# Patient Record
Sex: FEMALE | NOT HISPANIC OR LATINO | URBAN - METROPOLITAN AREA
[De-identification: names, ages, dates, MRNs, and addresses within clinical notes are randomized per-mention and may not be internally consistent; named-entity substitution may affect disease eponyms.]

---

## 2017-05-25 PROCEDURE — 81001 URINALYSIS AUTO W/SCOPE: CPT | Performed by: FAMILY MEDICINE

## 2017-11-20 PROCEDURE — 81001 URINALYSIS AUTO W/SCOPE: CPT | Performed by: FAMILY MEDICINE

## 2019-09-23 PROCEDURE — 87086 URINE CULTURE/COLONY COUNT: CPT | Performed by: PHYSICIAN ASSISTANT

## 2019-09-23 PROCEDURE — 81015 MICROSCOPIC EXAM OF URINE: CPT | Performed by: PHYSICIAN ASSISTANT

## 2020-08-25 PROBLEM — Z85.828 HISTORY OF BASAL CELL CARCINOMA (BCC) OF SKIN: Status: ACTIVE | Noted: 2020-08-25

## 2021-02-25 PROBLEM — L89.810: Status: RESOLVED | Noted: 2021-02-25 | Resolved: 2021-02-25

## 2021-02-25 PROBLEM — L89.810: Status: ACTIVE | Noted: 2021-02-25

## 2021-08-31 PROBLEM — N81.10 BLADDER PROLAPSE, FEMALE, ACQUIRED: Status: ACTIVE | Noted: 2021-08-31

## 2021-11-29 ENCOUNTER — EXTERNAL RECORD (OUTPATIENT)
Dept: OTHER | Age: 81
End: 2021-11-29

## 2021-11-29 LAB
CHOLESTEROL HDL RATIO: 2.5
CHOLESTEROL: 172 MG/DL
HDL CHOLESTEROL: 68 MG/DL
LDL CHOLESTEROL DIRECT: 93 MG/DL (ref 0–129)
NON HDL CHOLESTEROL: 104 MG/DL
TRIGLYCERIDES: 54 MG/DL
VLDL CHOLESTEROL: 11 MG/DL (ref 5–30)

## 2021-11-29 PROCEDURE — 99223 1ST HOSP IP/OBS HIGH 75: CPT | Performed by: STUDENT IN AN ORGANIZED HEALTH CARE EDUCATION/TRAINING PROGRAM

## 2021-11-30 ENCOUNTER — EXTERNAL RECORD (OUTPATIENT)
Dept: GASTROENTEROLOGY | Age: 81
End: 2021-11-30

## 2021-11-30 LAB
*MEAN CORPUSCULAR HGB CONC: 32.5 G/DL (ref 32.5–35.8)
A/G RATIO: 1.38 (ref 1.1–2.4)
ALANINE AMINOTRANSFE: 8 U/L (ref 7–52)
ALBUMIN, SERUM (ALB): 3.3 G/DL (ref 3.5–5.7)
ALKALINE PHOSPHATASE (ALK): 69 U/L (ref 34–104)
ANION GAP: 7 MEQ/L (ref 6.2–14.7)
ASPARTATE AMINOTRANS: 12 U/L (ref 13–39)
BASOPHIL AUTOMATED: 0.1 %
BASOPHILS: 0 (ref 0–0.14)
BILIRUBIN, TOTAL: 0.6 MG/DL (ref 0.2–0.8)
BLOOD UREA NITROGEN (BUN): 18 MG/DL (ref 7–25)
BUN/CREATININE RATIO: 36 (ref 7.4–23)
CALCIUM, SERUM: 8.8 MG/DL (ref 8.6–10.3)
CARBON DIOXIDE: 24 MEQ/L (ref 21–31)
CHLORIDE, SERUM: 111 MMOL/L (ref 98–107)
CREATININE: 0.5 MG/DL (ref 0.6–1.2)
EOSINOPHIL AUTOMATED: 0 %
EOSINOPHILS: 0 (ref 0–0.6)
EST GLOMERULAR FILTRATION RATE: > 60 ML/MIN
GLUCOSE: 131 MG/DL (ref 70–99)
HEMATOCRIT: 42.3 % (ref 34.7–45.1)
HEMOGLOBIN: 13.7 GM/DL (ref 12–15.3)
K (POTASSIUM, SERUM): 3.8 MMOL/L (ref 3.5–5.2)
LYMPHOCYTE AUTOMATED: 7.2 %
LYMPHOCYTES: 1 (ref 0.78–3.73)
MEAN CORPUSCULAR HGB: 28.3 PG (ref 26–34)
MEAN CORPUSCULAR VOL: 87.3 FL (ref 80–100)
MEAN PLATELET VOLUME: 7.6 FL (ref 6.8–10.2)
MONOCYTE AUTOMATED: 7 %
MONOCYTES: 1 (ref 0.17–1)
NA (SODIUM, SERUM): 142 MMOL/L (ref 133–144)
NEUTROPHIL ABSOLUTE: 11.8 (ref 1.91–7.6)
NEUTROPHIL AUTOMATED: 85.7 %
PLATELET COUNT: 186 K/MM3 (ref 150–450)
PROTEIN TOTAL: 5.7 G/DL (ref 6.4–8.9)
RED BLOOD CELL COUNT: 4.84 M/MM3 (ref 3.63–5.04)
RED CELL DISTRIBUTIO: 14.3 % (ref 11.9–15.9)
WHITE BLOOD CELL COU: 13.7 K/MM3 (ref 4–11)

## 2021-11-30 PROCEDURE — 99223 1ST HOSP IP/OBS HIGH 75: CPT | Performed by: INTERNAL MEDICINE

## 2021-11-30 PROCEDURE — 99233 SBSQ HOSP IP/OBS HIGH 50: CPT | Performed by: STUDENT IN AN ORGANIZED HEALTH CARE EDUCATION/TRAINING PROGRAM

## 2021-12-01 LAB
*MEAN CORPUSCULAR HGB CONC: 33.7 G/DL (ref 32.5–35.8)
ANION GAP: 7 MEQ/L (ref 6.2–14.7)
BLOOD UREA NITROGEN (BUN): 13 MG/DL (ref 7–25)
BUN/CREATININE RATIO: 28.9 (ref 7.4–23)
CALCIUM, SERUM: 8.7 MG/DL (ref 8.6–10.3)
CARBON DIOXIDE: 24 MEQ/L (ref 21–31)
CHLORIDE, SERUM: 110 MMOL/L (ref 98–107)
CREATININE: 0.45 MG/DL (ref 0.6–1.2)
EST GLOMERULAR FILTRATION RATE: > 60 ML/MIN
GLUCOSE: 92 MG/DL (ref 70–99)
HEMATOCRIT: 36.7 % (ref 34.7–45.1)
HEMOGLOBIN: 12.4 GM/DL (ref 12–15.3)
K (POTASSIUM, SERUM): 3.2 MMOL/L (ref 3.5–5.2)
MEAN CORPUSCULAR HGB: 28.9 PG (ref 26–34)
MEAN CORPUSCULAR VOL: 85.7 FL (ref 80–100)
MEAN PLATELET VOLUME: 7.9 FL (ref 6.8–10.2)
MG (MAGNESIUM, SERUM: 1.8 MG/DL (ref 1.6–2.6)
NA (SODIUM, SERUM): 141 MMOL/L (ref 133–144)
PLATELET COUNT: 147 K/MM3 (ref 150–450)
RED BLOOD CELL COUNT: 4.28 M/MM3 (ref 3.63–5.04)
RED CELL DISTRIBUTIO: 14.2 % (ref 11.9–15.9)
WHITE BLOOD CELL COU: 9.9 K/MM3 (ref 4–11)

## 2021-12-01 PROCEDURE — 99233 SBSQ HOSP IP/OBS HIGH 50: CPT | Performed by: STUDENT IN AN ORGANIZED HEALTH CARE EDUCATION/TRAINING PROGRAM

## 2021-12-01 PROCEDURE — 99233 SBSQ HOSP IP/OBS HIGH 50: CPT | Performed by: INTERNAL MEDICINE

## 2021-12-02 LAB
A/G RATIO: 1.17 (ref 1.1–2.4)
ALANINE AMINOTRANSFE: 7 U/L (ref 7–52)
ALBUMIN, SERUM (ALB): 2.8 G/DL (ref 3.5–5.7)
ALKALINE PHOSPHATASE (ALK): 63 U/L (ref 34–104)
ANION GAP: 9 MEQ/L (ref 6.2–14.7)
ASPARTATE AMINOTRANS: 13 U/L (ref 13–39)
BILIRUBIN, TOTAL: 0.7 MG/DL (ref 0.2–0.8)
BLOOD UREA NITROGEN (BUN): 13 MG/DL (ref 7–25)
BUN/CREATININE RATIO: 30.2 (ref 7.4–23)
CALCIUM, SERUM: 8.3 MG/DL (ref 8.6–10.3)
CARBON DIOXIDE: 22 MEQ/L (ref 21–31)
CHLORIDE, SERUM: 110 MMOL/L (ref 98–107)
CREATININE: 0.43 MG/DL (ref 0.6–1.2)
EST GLOMERULAR FILTRATION RATE: > 60 ML/MIN
GLUCOSE: 74 MG/DL (ref 70–99)
K (POTASSIUM, SERUM): 3.4 MMOL/L (ref 3.5–5.2)
NA (SODIUM, SERUM): 141 MMOL/L (ref 133–144)
PROTEIN TOTAL: 5.2 G/DL (ref 6.4–8.9)

## 2021-12-02 PROCEDURE — 99232 SBSQ HOSP IP/OBS MODERATE 35: CPT | Performed by: STUDENT IN AN ORGANIZED HEALTH CARE EDUCATION/TRAINING PROGRAM

## 2021-12-03 PROCEDURE — 99239 HOSP IP/OBS DSCHRG MGMT >30: CPT | Performed by: STUDENT IN AN ORGANIZED HEALTH CARE EDUCATION/TRAINING PROGRAM

## 2021-12-06 ENCOUNTER — TELEPHONE (OUTPATIENT)
Dept: GASTROENTEROLOGY | Age: 81
End: 2021-12-06

## 2023-06-05 ENCOUNTER — ANESTHESIA EVENT (OUTPATIENT)
Dept: SURGERY | Facility: HOSPITAL | Age: 83
End: 2023-06-05
Payer: MEDICARE

## 2023-06-05 ENCOUNTER — HOSPITAL ENCOUNTER (OUTPATIENT)
Facility: HOSPITAL | Age: 83
Setting detail: HOSPITAL OUTPATIENT SURGERY
Discharge: HOME | End: 2023-06-05
Attending: STUDENT IN AN ORGANIZED HEALTH CARE EDUCATION/TRAINING PROGRAM | Admitting: STUDENT IN AN ORGANIZED HEALTH CARE EDUCATION/TRAINING PROGRAM
Payer: MEDICARE

## 2023-06-05 ENCOUNTER — ANESTHESIA (OUTPATIENT)
Dept: SURGERY | Facility: HOSPITAL | Age: 83
End: 2023-06-05
Payer: MEDICARE

## 2023-06-05 VITALS
HEIGHT: 66.5 IN | RESPIRATION RATE: 16 BRPM | WEIGHT: 150 LBS | DIASTOLIC BLOOD PRESSURE: 83 MMHG | OXYGEN SATURATION: 96 % | BODY MASS INDEX: 23.82 KG/M2 | SYSTOLIC BLOOD PRESSURE: 145 MMHG | TEMPERATURE: 99 F | HEART RATE: 76 BPM

## 2023-06-05 PROCEDURE — 0USG0ZZ REPOSITION VAGINA, OPEN APPROACH: ICD-10-PCS | Performed by: STUDENT IN AN ORGANIZED HEALTH CARE EDUCATION/TRAINING PROGRAM

## 2023-06-05 PROCEDURE — 0JQC0ZZ REPAIR PELVIC REGION SUBCUTANEOUS TISSUE AND FASCIA, OPEN APPROACH: ICD-10-PCS | Performed by: STUDENT IN AN ORGANIZED HEALTH CARE EDUCATION/TRAINING PROGRAM

## 2023-06-05 PROCEDURE — 0ULG7ZZ OCCLUSION OF VAGINA, VIA NATURAL OR ARTIFICIAL OPENING: ICD-10-PCS | Performed by: STUDENT IN AN ORGANIZED HEALTH CARE EDUCATION/TRAINING PROGRAM

## 2023-06-05 RX ORDER — HYDROMORPHONE HYDROCHLORIDE 1 MG/ML
0.2 INJECTION, SOLUTION INTRAMUSCULAR; INTRAVENOUS; SUBCUTANEOUS EVERY 5 MIN PRN
Status: DISCONTINUED | OUTPATIENT
Start: 2023-06-05 | End: 2023-06-05

## 2023-06-05 RX ORDER — SODIUM CHLORIDE, SODIUM LACTATE, POTASSIUM CHLORIDE, CALCIUM CHLORIDE 600; 310; 30; 20 MG/100ML; MG/100ML; MG/100ML; MG/100ML
INJECTION, SOLUTION INTRAVENOUS CONTINUOUS
Status: DISCONTINUED | OUTPATIENT
Start: 2023-06-05 | End: 2023-06-05

## 2023-06-05 RX ORDER — HEPARIN SODIUM 5000 [USP'U]/ML
5000 INJECTION, SOLUTION INTRAVENOUS; SUBCUTANEOUS ONCE
Status: COMPLETED | OUTPATIENT
Start: 2023-06-05 | End: 2023-06-05

## 2023-06-05 RX ORDER — CEFAZOLIN SODIUM/WATER 2 G/20 ML
2 SYRINGE (ML) INTRAVENOUS ONCE
Status: COMPLETED | OUTPATIENT
Start: 2023-06-05 | End: 2023-06-05

## 2023-06-05 RX ORDER — POLYETHYLENE GLYCOL 3350 17 G/17G
17 POWDER, FOR SOLUTION ORAL DAILY PRN
Qty: 72 EACH | Refills: 0 | Status: SHIPPED | OUTPATIENT
Start: 2023-06-05 | End: 2023-07-05

## 2023-06-05 RX ORDER — METOCLOPRAMIDE HYDROCHLORIDE 5 MG/ML
10 INJECTION INTRAMUSCULAR; INTRAVENOUS EVERY 8 HOURS PRN
Status: DISCONTINUED | OUTPATIENT
Start: 2023-06-05 | End: 2023-06-05

## 2023-06-05 RX ORDER — EPHEDRINE SULFATE 50 MG/ML
INJECTION INTRAVENOUS AS NEEDED
Status: DISCONTINUED | OUTPATIENT
Start: 2023-06-05 | End: 2023-06-05 | Stop reason: SURG

## 2023-06-05 RX ORDER — KETOROLAC TROMETHAMINE 10 MG/1
10 TABLET, FILM COATED ORAL EVERY 6 HOURS PRN
Qty: 20 TABLET | Refills: 0 | Status: SHIPPED | OUTPATIENT
Start: 2023-06-05 | End: 2023-06-10

## 2023-06-05 RX ORDER — DEXAMETHASONE SODIUM PHOSPHATE 4 MG/ML
VIAL (ML) INJECTION AS NEEDED
Status: DISCONTINUED | OUTPATIENT
Start: 2023-06-05 | End: 2023-06-05 | Stop reason: SURG

## 2023-06-05 RX ORDER — HYDROMORPHONE HYDROCHLORIDE 1 MG/ML
0.4 INJECTION, SOLUTION INTRAMUSCULAR; INTRAVENOUS; SUBCUTANEOUS EVERY 5 MIN PRN
Status: DISCONTINUED | OUTPATIENT
Start: 2023-06-05 | End: 2023-06-05

## 2023-06-05 RX ORDER — LIDOCAINE HYDROCHLORIDE 10 MG/ML
INJECTION, SOLUTION EPIDURAL; INFILTRATION; INTRACAUDAL; PERINEURAL AS NEEDED
Status: DISCONTINUED | OUTPATIENT
Start: 2023-06-05 | End: 2023-06-05 | Stop reason: SURG

## 2023-06-05 RX ORDER — ONDANSETRON 2 MG/ML
4 INJECTION INTRAMUSCULAR; INTRAVENOUS EVERY 6 HOURS PRN
Status: DISCONTINUED | OUTPATIENT
Start: 2023-06-05 | End: 2023-06-05

## 2023-06-05 RX ORDER — ACETAMINOPHEN 500 MG
1000 TABLET ORAL ONCE
Status: COMPLETED | OUTPATIENT
Start: 2023-06-05 | End: 2023-06-05

## 2023-06-05 RX ORDER — ONDANSETRON 2 MG/ML
INJECTION INTRAMUSCULAR; INTRAVENOUS AS NEEDED
Status: DISCONTINUED | OUTPATIENT
Start: 2023-06-05 | End: 2023-06-05 | Stop reason: SURG

## 2023-06-05 RX ORDER — PHENAZOPYRIDINE HYDROCHLORIDE 100 MG/1
100 TABLET, FILM COATED ORAL ONCE
Status: COMPLETED | OUTPATIENT
Start: 2023-06-05 | End: 2023-06-05

## 2023-06-05 RX ORDER — NALOXONE HYDROCHLORIDE 0.4 MG/ML
80 INJECTION, SOLUTION INTRAMUSCULAR; INTRAVENOUS; SUBCUTANEOUS AS NEEDED
Status: DISCONTINUED | OUTPATIENT
Start: 2023-06-05 | End: 2023-06-05

## 2023-06-05 RX ORDER — HYDROMORPHONE HYDROCHLORIDE 1 MG/ML
0.6 INJECTION, SOLUTION INTRAMUSCULAR; INTRAVENOUS; SUBCUTANEOUS EVERY 5 MIN PRN
Status: DISCONTINUED | OUTPATIENT
Start: 2023-06-05 | End: 2023-06-05

## 2023-06-05 RX ORDER — MORPHINE SULFATE 4 MG/ML
2 INJECTION, SOLUTION INTRAMUSCULAR; INTRAVENOUS EVERY 10 MIN PRN
Status: DISCONTINUED | OUTPATIENT
Start: 2023-06-05 | End: 2023-06-05

## 2023-06-05 RX ORDER — KETOROLAC TROMETHAMINE 15 MG/ML
15 INJECTION, SOLUTION INTRAMUSCULAR; INTRAVENOUS ONCE
Status: COMPLETED | OUTPATIENT
Start: 2023-06-05 | End: 2023-06-05

## 2023-06-05 RX ORDER — MORPHINE SULFATE 4 MG/ML
4 INJECTION, SOLUTION INTRAMUSCULAR; INTRAVENOUS EVERY 10 MIN PRN
Status: DISCONTINUED | OUTPATIENT
Start: 2023-06-05 | End: 2023-06-05

## 2023-06-05 RX ORDER — MORPHINE SULFATE 10 MG/ML
6 INJECTION, SOLUTION INTRAMUSCULAR; INTRAVENOUS EVERY 10 MIN PRN
Status: DISCONTINUED | OUTPATIENT
Start: 2023-06-05 | End: 2023-06-05

## 2023-06-05 RX ADMIN — SODIUM CHLORIDE, SODIUM LACTATE, POTASSIUM CHLORIDE, CALCIUM CHLORIDE: 600; 310; 30; 20 INJECTION, SOLUTION INTRAVENOUS at 13:39:00

## 2023-06-05 RX ADMIN — EPHEDRINE SULFATE 10 MG: 50 INJECTION INTRAVENOUS at 12:44:00

## 2023-06-05 RX ADMIN — SODIUM CHLORIDE, SODIUM LACTATE, POTASSIUM CHLORIDE, CALCIUM CHLORIDE: 600; 310; 30; 20 INJECTION, SOLUTION INTRAVENOUS at 14:08:00

## 2023-06-05 RX ADMIN — ONDANSETRON 4 MG: 2 INJECTION INTRAMUSCULAR; INTRAVENOUS at 12:32:00

## 2023-06-05 RX ADMIN — LIDOCAINE HYDROCHLORIDE 50 MG: 10 INJECTION, SOLUTION EPIDURAL; INFILTRATION; INTRACAUDAL; PERINEURAL at 12:19:00

## 2023-06-05 RX ADMIN — CEFAZOLIN SODIUM/WATER 2 G: 2 G/20 ML SYRINGE (ML) INTRAVENOUS at 12:14:00

## 2023-06-05 RX ADMIN — DEXAMETHASONE SODIUM PHOSPHATE 4 MG: 4 MG/ML VIAL (ML) INJECTION at 12:32:00

## 2023-06-05 NOTE — ANESTHESIA POSTPROCEDURE EVALUATION
Patient: Sarah Lopez    Procedure Summary       Date: 06/05/23 Room / Location: [address] [place] [address] / [address] [place] [address] MAIN OR    Anesthesia Start: 1214 Anesthesia Stop: 1781    Procedures:       [place]eforte colpoclesis, tight perineorrhaphy, posterior repair, cystoscopy (Uterus)      CYSTOSCOPY (Urethra) Diagnosis: (Complete uterovaginal prolapse)    Surgeons: Nadir Kulkarni DO Anesthesiologist: Jenni Bailey MD    Anesthesia Type: general ASA Status: 2            Anesthesia Type: general    Vitals Value Taken Time   /89 06/05/23 1408   Temp 98.8 06/05/23 1408   Pulse 78 06/05/23 1407   Resp 16 06/05/23 1408   SpO2 91 % 06/05/23 1407   Vitals shown include unvalidated device data.     [address] [place] [address] AN Post Evaluation:   Patient Evaluated in PACU  Patient Participation: complete - patient participated  Level of Consciousness: awake  Pain Score: 0  Pain Management: adequate  Airway Patency:patent  Dental exam unchanged from preop  Yes    Cardiovascular Status: acceptable  Respiratory Status: acceptable and nasal cannula  Postoperative Hydration acceptable      Chris Yip CRNA  6/5/2023 2:08 PM

## 2023-06-05 NOTE — OPERATIVE REPORT
[address] UNIT  Operative Note     [facility] Location: OR   CSN [id number] MRN [id number]   Admission Date 6/5/2023 Operation Date 6/5/2023   Attending Physician Boo Ugaret DO Operating Physician Sepideh Bland DO      Preoperative Diagnosis: Complete uterovaginal prolapse     Postoperative Diagnosis: Complete uterovaginal prolapse     Procedure Performed:   Leforte colpoclesis, tight perineorrhaphy, posterior repair, cystoscopy     Primary Surgeon: Sepideh Bland DO      Assistant: none     Surgical Findings:   -Complete reduction of POP  -b/l ureteral efflux  -Introitus ~ 1.5 finger breadths     Anesthesia: General     Complications: none     Implants: * No implants in log *     Specimen: none     Drains: 16F diaz catheter to PACU     Condition: stable     Estimated Blood Loss: Blood Output: 100 mL (6/5/2023  1:56 PM)       Summary of Case:     82yo female with bothersome stage IV uterovaginal prolapse. She was counseled on management options for the prolapse including abdominal and vaginal approaches with or without uterine sparing. She ultimately elected to pursue above procedures with uterine-sparing approach. Patient had CHOCO on preop exam but she did not want to have sling placed given elevated PVR and no subjective CHOCO. She elected to proceed in fully informed fashion after discussion of procedures, alternatives, benefits, and risks. After appropriate patient identification and informed consent was obtained, the patient was taken to the [address] [place] [address] where she underwent general anesthesia. She was prepped and draped in the usual sterile fashion in the dorsal lithotomy position and a surgical pause was performed. [place] Colpocleisis  A 16 Fr Diaz catheter was placed. At this time, the vagina was inspected and we found apex was generally well supported but with significant fairly symmetric anterior and posterior prolapse.  After careful examination, we proceeded with a [place] colpocleisis. A rectangle of vaginal epithelium extending proximally from the cervix to 2 cm proximal to the bladder neck was marked anteriorly. Same rectangle marked posteriorly. Lidocaine with epinephrine was injected subepithelially to hydrodissect the epithelium from the underlying fascia. An incision was done with a knife anteriorly over the already marked rectangle. The epithelium was then carefully removed using Metzenbaum scissors and blunt dissection with a gauze. We then performed the same thing posteriorly. We next approximated the proximal epithelial anterior and posterior edges with running 2-0 PDS. Several interrupted PDS sutures were placed in the proximal middle portion of both anterior and posterior muscularis to begin dunking the apex. The epithelial edges bilaterally were re-approximated bilaterally using interrupted 2-0 PDS moving proximally to distally. Additional midline dunking stitches were placed as well. Finally, the distal edges of the epithelium from rectangle removal were approximated with running 2-0 PDS. This resulted in reduction of the prolapse with bilateral widely patent epithelial tunnels. Cystoscopy was performed with a 70 degree lens and confirmed bilateral ureteral efflux and no bladder injury. Levator myorrhaphy, rectocele repair and perineorrhaphy     We then proceeded with our levator myorrhaphy and perineorrhaphy. A trapezoidal incision was made, and the overlying vaginal epithelium was excised, almost connecting the previous incision to this one. The levators were cleared bilaterally in order to facilitate the levator myorrhaphy. Interrupted 0 PDS sutures were used to plicate the levators starting midline to lateral until excellent posterior support was noted. This maneuver also reduced the rectocele. We then closed the vaginal epithelium with a 0-0 Vicryl in a running fashion. The introitus was able to accommodate 1.5 fingers to the depth of 2 cm. Excellent hemostasis was achieved. The Pavon catheter remained in place. Vaginal sweep was negative, and counts were correct. The patient was awakened from general anesthetic without complication and taking to the PACU in stable condition.         Neymar Vasquez DO  6/5/2023  2:20 PM

## 2023-06-05 NOTE — ANESTHESIA PROCEDURE NOTES
Airway  Date/Time: 6/5/2023 12:20 PM  Urgency: Elective    Airway not difficult    General Information and Staff    Patient location during procedure: OR  Anesthesiologist: [place] [facility], MD  Resident/CRNA: Karyna Mayfield CRNA  Performed: CRNA   Performed by: Karyna Mayfield CRNA  Authorized by: [place] [facility], MD      Indications and Patient Condition  Indications for airway management: anesthesia  Sedation level: deep  Preoxygenated: yes  Patient position: sniffing  Mask difficulty assessment: 1 - vent by mask    Final Airway Details  Final airway type: supraglottic airway      Successful airway: classic  Size 4       Number of attempts at approach: 1  Number of other approaches attempted: 0

## 2023-06-05 NOTE — INTERVAL H&P NOTE
Pre-op Diagnosis: Complete uterovaginal prolapse    The above referenced H&P was reviewed by Peter Pérez DO on 6/5/2023, the patient was examined and no significant changes have occurred in the patient's condition since the H&P was performed. I discussed with the patient and/or legal representative the potential benefits, risks and side effects of this procedure; the likelihood of the patient achieving goals; and potential problems that might occur during recuperation. I discussed reasonable alternatives to the procedure, including risks, benefits and side effects related to the alternatives and risks related to not receiving this procedure. We will proceed with procedure as planned.

## (undated) DEVICE — SKIN PREP TRAY 4 COMPARTM TRAY: Brand: MEDLINE INDUSTRIES, INC.

## (undated) DEVICE — SOL NACL IRRIG 0.9% 1000ML BTL

## (undated) DEVICE — DRAPE SRG 131X112X63IN GYN URO

## (undated) DEVICE — SOL H2O 1000ML

## (undated) DEVICE — LONE STAR LARGE RING RETRACTOR

## (undated) DEVICE — FLEXIBLE YANKAUER,MEDIUM TIP, NO VACUUM CONTROL: Brand: ARGYLE

## (undated) DEVICE — DRAPE,UNDRBUT,WHT GRAD PCH,CAPPORT,20/CS: Brand: MEDLINE

## (undated) DEVICE — TUBING CYSTO TUR DUAL

## (undated) DEVICE — SUT VICRYL 0 CT-1 J340H

## (undated) DEVICE — MEDI-VAC NON-CONDUCTIVE SUCTION TUBING: Brand: CARDINAL HEALTH

## (undated) DEVICE — SUT PDS II 2-0 CT-2 Z333H

## (undated) DEVICE — SUT PDS II 0 CT-1 Z346H

## (undated) DEVICE — GAMMEX® PI HYBRID SIZE 6, STERILE POWDER-FREE SURGICAL GLOVE, POLYISOPRENE AND NEOPRENE BLEND: Brand: GAMMEX

## (undated) DEVICE — PAD ALC 43.5X24IN PREP  LF

## (undated) DEVICE — MINOR GENERAL: Brand: MEDLINE INDUSTRIES, INC.

## (undated) DEVICE — TRAY SURESTEP 16 BARDEX DRAIN

## (undated) DEVICE — 20 ML SYRINGE LUER-LOCK TIP: Brand: MONOJECT

## (undated) DEVICE — DRAPE SHEET LAVH 124X112X30

## (undated) DEVICE — GAMMEX® PI HYBRID SIZE 6.5, STERILE POWDER-FREE SURGICAL GLOVE, POLYISOPRENE AND NEOPRENE BLEND: Brand: GAMMEX

## (undated) DEVICE — Device: Brand: JELCO

## (undated) DEVICE — TRAY SKIN PREP PVP-1

## (undated) DEVICE — VAGINAL PACKING: Brand: DEROYAL

## (undated) DEVICE — GAMMEX® PI HYBRID SIZE 7, STERILE POWDER-FREE SURGICAL GLOVE, POLYISOPRENE AND NEOPRENE BLEND: Brand: GAMMEX

## (undated) DEVICE — LONE STAR 5MM HOOKS LRG STAYS

## (undated) DEVICE — MEGADYNE ELECTRODE ADULT PT RT

## (undated) DEVICE — STERILE SURGICAL LUBRICANT, METAL TUBE: Brand: SURGILUBE

## (undated) DEVICE — SLEEVE KENDALL SCD EXPRESS MED

## (undated) DEVICE — STANDARD HYPODERMIC NEEDLE,POLYPROPYLENE HUB: Brand: MONOJECT

## (undated) DEVICE — SOL H2O IRRIGATION 3000ML

## (undated) DEVICE — STERILE WATER 1000ML BTL

## (undated) DEVICE — PEN: MARKING STD PT 100/CS: Brand: MEDICAL ACTION INDUSTRIES

## (undated) DEVICE — INTENDED FOR TISSUE SEPARATION, AND OTHER PROCEDURES THAT REQUIRE A SHARP SURGICAL BLADE TO PUNCTURE OR CUT.: Brand: BARD-PARKER ® STAINLESS STEEL BLADES